# Patient Record
Sex: MALE | URBAN - METROPOLITAN AREA
[De-identification: names, ages, dates, MRNs, and addresses within clinical notes are randomized per-mention and may not be internally consistent; named-entity substitution may affect disease eponyms.]

---

## 2019-09-26 ENCOUNTER — OFFICE VISIT (OUTPATIENT)
Dept: CARDIOLOGY | Facility: CLINIC | Age: 49
End: 2019-09-26
Payer: COMMERCIAL

## 2019-09-26 VITALS
WEIGHT: 165 LBS | DIASTOLIC BLOOD PRESSURE: 80 MMHG | HEART RATE: 71 BPM | TEMPERATURE: 98.1 F | SYSTOLIC BLOOD PRESSURE: 132 MMHG | BODY MASS INDEX: 23.1 KG/M2 | HEIGHT: 71 IN | OXYGEN SATURATION: 99 % | RESPIRATION RATE: 16 BRPM

## 2019-09-26 DIAGNOSIS — Z72.0 TOBACCO ABUSE: ICD-10-CM

## 2019-09-26 DIAGNOSIS — R07.89 CHEST TIGHTNESS: Primary | ICD-10-CM

## 2019-09-26 LAB — HBA1C MFR BLD: 5.3 % (ref 0–5.6)

## 2019-09-26 PROCEDURE — 84443 ASSAY THYROID STIM HORMONE: CPT | Performed by: INTERNAL MEDICINE

## 2019-09-26 PROCEDURE — 93000 ELECTROCARDIOGRAM COMPLETE: CPT | Performed by: INTERNAL MEDICINE

## 2019-09-26 PROCEDURE — 36415 COLL VENOUS BLD VENIPUNCTURE: CPT | Performed by: INTERNAL MEDICINE

## 2019-09-26 PROCEDURE — 99205 OFFICE O/P NEW HI 60 MIN: CPT | Mod: 25 | Performed by: INTERNAL MEDICINE

## 2019-09-26 PROCEDURE — 83036 HEMOGLOBIN GLYCOSYLATED A1C: CPT | Performed by: INTERNAL MEDICINE

## 2019-09-26 ASSESSMENT — MIFFLIN-ST. JEOR: SCORE: 1627.63

## 2019-09-26 NOTE — PATIENT INSTRUCTIONS
"You were seen today in the Cardiovascular Clinic at the [facility].     Cardiology Providers you saw during your visit: Dr. Alexandra Hartley     Diagnosis:   Encounter Diagnosis   Name Primary?     Chest tightness Yes        Results: Discussed with you today EKG      Orders:   Orders Placed This Encounter   Procedures     CBC with platelets differential     Comprehensive metabolic panel     Lipid panel reflex to direct LDL Fasting     TSH with free T4 reflex     Hemoglobin A1c     EKG 12-lead complete w/read - Clinics     Exercise Stress Echocardiogram         Recommendations:   1. Labs today  2. Exercise Stress Echocardiogram    Follow up depending on results       Please feel free to call me with any questions or concerns.       Linda Davis LPN     Questions: [phone]  First press #1 for [facility] [email or web address] for \"Medical Questions\" to reach us Cardiology Nurses.   [phone]   First press #1 for the [email or web address] and then press #1     On Call Cardiologist for after hours or on weekends: [phone]   option #4 and ask to speak to the on-call Cardiologist.          If you need a medication refill please contact your pharmacy.  Please allow 3 business days for your refill to be completed.  --------------------------------------------------------------    Instructions for exercise echo    1. Nothing to eat or drink 3 hours prior except for water.   2. No caffeine, alcohol, or tobacco 12 hours prior  3. Wear comfortable clothes.   4. Stop beta blocker 24 hours prior and day of test.      If your question concerns the schedule/appointment times, contact:  [phone]  "

## 2019-09-26 NOTE — PROGRESS NOTES
"I am delighted to see Nakul ARIAS David in consultation for chest pain.    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 Toward the end of the day he had left side chest tightness that lasted about 30 minutes, without diaphoresis, nausea, radiation, dyspnea. During that time he continued with his routine activities, including walking around the office, taking stairs, etc without any increase in chest tightness. No recurrence.    At baseline he does not regular exercise but is very active, probably walks 15,000 steps daily at work, takes stairs without any chest tightness. Recently dealing with lower back pain. Smokes a pack a day for 20 years. No regular medical check ups.    The following portions of the patient's history were reviewed and updated as appropriate: allergies, current medications, past family history, past medical history, past social history, past surgical history, and the problem list.    Past Medical History:  Knee surgery 2018  Ongoing tobacco 1ppd for 20 years    Medications:   none    Allergies:  No Known Allergies       Family History: Father - CHF 70; mother  cancer    Psychosocial history: , kids; works in sales    Review of systems:   Cardiovascular: No palpitations, chest pain, shortness of breath at rest, dyspnea with exertion, orthopnea, paroxysmal nocturia dyspnea, nocturia, dizziness, syncope.    In addition,   Constitutional: No change in weight, sleep or appetite.  Normal energy.  No fever or chills  Eyes: Negative for vision changes or eye problems  ENT: No problems with ears, nose or throat.  No difficulty swallowing.  Resp: No coughing, wheezing or shortness of breath  GI: No nausea, vomiting,  heartburn, abdominal pain, " diarrhea, constipation or change in bowel habits  : No urinary frequency or dysuria, bladder or kidney problems  Musculoskeletal: No significant muscle or joint pains  Neurologic: No headaches, numbness, tingling, weakness, problems with balance or coordination  Psychiatric: No problems with anxiety, depression or mental health  Heme/immune/allergy: No history of bleeding or clotting problems or anemia.  No allergies or immune system problems  Integumentary: No rashes,worrisome lesions or skin problems      Physical examination  Vitals: 132/80, 71 bpm  BMI= 23    Constitutional: In general, the patient is a pleasant male in no apparent distress.    Eyes: PERRLA.  EOMI.  Sclerae white, not injected.  ENT/mouth: Normiocephalic and atraumatic.  Nares clear.  Pharynx without erythema or exudate.  Dentition intact.  No adenopathy.  No thyromegaly. Carotids +2/2 bilaterally without bruits.  No jugular venous distension.   Card/Vasc: The PMI is in the 5th ICS in the midclavicular line. There is no heave. Regular rate and rhythm. Normal S1, S2. No murmur, rub, click, or gallop. Pulses are normal bilaterally throughout. No peripheral edema.  Respiratory: Clear to asculation.  No ronchi, wheezes, rales.  No dullness to percussion.   GI: Abdomen is soft, nontender, nondistended. No organomegaly. No AAA.  No bruits.   Integument: No significant bruises or rashes  Neurological: The neurological examination reveal a patient who was oriented to person, place, and time.    Psych: Normal  Heme/Lymph/Immun: no significant adenopathy      I have personally and independently reviewed the following:  EKG today; sinus, normal intervals      Assessment :  Chest tightness, unlikely cardiac however he does have risk factor of ongoing tobacco use.      Plan:  Exercise stress echo  Smoking cessation  Labs today - lipid profile, TSH, A1C      The patient is to return pending above . The patient understood the treatment plan as outlined  above.  There were no barriers to learning.      Alexandra Hartley MD

## 2019-09-26 NOTE — LETTER
Date:September 27, 2019      Patient was self referred, no letter generated. Do not send.        [facility] Health Information

## 2019-09-26 NOTE — LETTER
"2019      RE: Nakul Hernandez  [address]  [place] [address]       Dear Colleague,    Thank you for the opportunity to participate in the care of your patient, Nakul Hernandez, at the [facility] at [facility]. Please see a copy of my visit note below.    I am delighted to see Nakul Hernandez in consultation for chest pain.    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 Toward the end of the day he had left side chest tightness that lasted about 30 minutes, without diaphoresis, nausea, radiation, dyspnea. During that time he continued with his routine activities, including walking around the office, taking stairs, etc without any increase in chest tightness. No recurrence.    At baseline he does not regular exercise but is very active, probably walks 15,000 steps daily at work, takes stairs without any chest tightness. Recently dealing with lower back pain. Smokes a pack a day for 20 years. No regular medical check ups.    The following portions of the patient's history were reviewed and updated as appropriate: allergies, current medications, past family history, past medical history, past social history, past surgical history, and the problem list.    Past Medical History:  Knee surgery 2018  Ongoing tobacco 1ppd for 20 years    Medications:   none    Allergies:  No Known Allergies       Family History: Father - CHF 70; mother  cancer    Psychosocial history: , kids; works in sales    Review of systems:   Cardiovascular: No palpitations, chest pain, shortness of breath at rest, dyspnea with exertion, orthopnea, paroxysmal nocturia dyspnea, nocturia, " dizziness, syncope.    In addition,   Constitutional: No change in weight, sleep or appetite.  Normal energy.  No fever or chills  Eyes: Negative for vision changes or eye problems  ENT: No problems with ears, nose or throat.  No difficulty swallowing.  Resp: No coughing, wheezing or shortness of breath  GI: No nausea, vomiting,  heartburn, abdominal pain, diarrhea, constipation or change in bowel habits  : No urinary frequency or dysuria, bladder or kidney problems  Musculoskeletal: No significant muscle or joint pains  Neurologic: No headaches, numbness, tingling, weakness, problems with balance or coordination  Psychiatric: No problems with anxiety, depression or mental health  Heme/immune/allergy: No history of bleeding or clotting problems or anemia.  No allergies or immune system problems  Integumentary: No rashes,worrisome lesions or skin problems      Physical examination  Vitals: 132/80, 71 bpm  BMI= 23    Constitutional: In general, the patient is a pleasant male in no apparent distress.    Eyes: PERRLA.  EOMI.  Sclerae white, not injected.  ENT/mouth: Normiocephalic and atraumatic.  Nares clear.  Pharynx without erythema or exudate.  Dentition intact.  No adenopathy.  No thyromegaly. Carotids +2/2 bilaterally without bruits.  No jugular venous distension.   Card/Vasc: The PMI is in the 5th ICS in the midclavicular line. There is no heave. Regular rate and rhythm. Normal S1, S2. No murmur, rub, click, or gallop. Pulses are normal bilaterally throughout. No peripheral edema.  Respiratory: Clear to asculation.  No ronchi, wheezes, rales.  No dullness to percussion.   GI: Abdomen is soft, nontender, nondistended. No organomegaly. No AAA.  No bruits.   Integument: No significant bruises or rashes  Neurological: The neurological examination reveal a patient who was oriented to person, place, and time.    Psych: Normal  Heme/Lymph/Immun: no significant adenopathy      I have personally and independently  reviewed the following:  EKG today; sinus, normal intervals      Assessment :  Chest tightness, unlikely cardiac however he does have risk factor of ongoing tobacco use.      Plan:  Exercise stress echo  Smoking cessation  Labs today - lipid profile, TSH, A1C      The patient is to return pending above . The patient understood the treatment plan as outlined above.  There were no barriers to learning.      Alexandra Hartley MD     Please do not hesitate to contact me if you have any questions/concerns.     Sincerely,     Alexandra Hartley MD

## 2019-09-27 LAB — TSH SERPL DL<=0.005 MIU/L-ACNC: 2.75 MU/L (ref 0.4–4)

## 2019-10-07 ENCOUNTER — HOSPITAL ENCOUNTER (OUTPATIENT)
Dept: CARDIOLOGY | Facility: CLINIC | Age: 49
Discharge: HOME | End: 2019-10-07
Attending: INTERNAL MEDICINE | Admitting: INTERNAL MEDICINE
Payer: COMMERCIAL

## 2019-10-07 DIAGNOSIS — R07.89 CHEST TIGHTNESS: ICD-10-CM

## 2019-10-07 PROCEDURE — 93350 STRESS TTE ONLY: CPT | Mod: 26 | Performed by: INTERNAL MEDICINE

## 2019-10-07 PROCEDURE — 93350 STRESS TTE ONLY: CPT | Mod: TC

## 2019-10-07 PROCEDURE — 93018 CV STRESS TEST I&R ONLY: CPT | Performed by: INTERNAL MEDICINE

## 2019-10-07 PROCEDURE — 93325 DOPPLER ECHO COLOR FLOW MAPG: CPT | Mod: 26 | Performed by: INTERNAL MEDICINE

## 2019-10-07 PROCEDURE — 93016 CV STRESS TEST SUPVJ ONLY: CPT | Performed by: INTERNAL MEDICINE

## 2019-10-07 PROCEDURE — 93321 DOPPLER ECHO F-UP/LMTD STD: CPT | Mod: 26 | Performed by: INTERNAL MEDICINE

## 2020-02-04 ENCOUNTER — DOCUMENTATION ONLY (OUTPATIENT)
Dept: CARDIOLOGY | Facility: CLINIC | Age: 50
End: 2020-02-04

## 2020-02-04 NOTE — PROGRESS NOTES
Labs on 19 have  and letter was sent on 19.    Please close (not just done) this encounter if nothing more needs to be done with it.    Thanks,  lab